# Patient Record
Sex: FEMALE | Race: WHITE
[De-identification: names, ages, dates, MRNs, and addresses within clinical notes are randomized per-mention and may not be internally consistent; named-entity substitution may affect disease eponyms.]

---

## 2017-04-04 ENCOUNTER — HOSPITAL ENCOUNTER (OUTPATIENT)
Dept: HOSPITAL 18 - NAVSJIPCSP | Age: 77
Discharge: HOME | End: 2017-04-04
Attending: INTERNAL MEDICINE
Payer: MEDICARE

## 2017-04-04 DIAGNOSIS — E78.5: Primary | ICD-10-CM

## 2017-04-04 DIAGNOSIS — I20.9: ICD-10-CM

## 2017-04-04 DIAGNOSIS — I10: ICD-10-CM

## 2017-04-04 LAB
ALBUMIN SERPL BCG-MCNC: 4 G/DL (ref 3.4–4.8)
ALP SERPL-CCNC: 79 U/L (ref 40–150)
ALT SERPL W P-5'-P-CCNC: 45 U/L (ref 0–55)
ANION GAP SERPL CALC-SCNC: 17 MMOL/L (ref 10–20)
AST SERPL-CCNC: 52 U/L (ref 5–34)
BASOPHILS # BLD AUTO: 0 THOU/UL (ref 0–0.2)
BASOPHILS NFR BLD AUTO: 0.7 % (ref 0–1)
BILIRUB SERPL-MCNC: 0.3 MG/DL (ref 0.2–1.2)
BUN SERPL-MCNC: 10 MG/DL (ref 9.8–20.1)
CALCIUM SERPL-MCNC: 9.1 MG/DL (ref 7.8–10.44)
CHD RISK SERPL-RTO: 4.5 (ref ?–4.5)
CHLORIDE SERPL-SCNC: 104 MMOL/L (ref 98–107)
CHOLEST SERPL-MCNC: 234 MG/DL
CO2 SERPL-SCNC: 25 MMOL/L (ref 23–31)
CREAT CL PREDICTED SERPL C-G-VRATE: 0 ML/MIN (ref 70–130)
EOSINOPHIL # BLD AUTO: 0.3 THOU/UL (ref 0–0.7)
EOSINOPHIL NFR BLD AUTO: 3.5 % (ref 0–10)
GLOBULIN SER CALC-MCNC: 2.8 G/DL (ref 2.4–3.5)
GLUCOSE SERPL-MCNC: 87 MG/DL (ref 83–110)
HDLC SERPL-MCNC: 52 MG/DL
HGB BLD-MCNC: 13.5 G/DL (ref 12–16)
LDLC SERPL CALC-MCNC: 110 MG/DL
LYMPHOCYTES # BLD AUTO: 2.3 THOU/UL (ref 1.2–3.4)
LYMPHOCYTES NFR BLD AUTO: 31.8 % (ref 21–51)
MCH RBC QN AUTO: 32.2 PG (ref 27–31)
MCV RBC AUTO: 93.8 FL (ref 81–99)
MONOCYTES # BLD AUTO: 0.7 THOU/UL (ref 0.11–0.59)
MONOCYTES NFR BLD AUTO: 10.2 % (ref 0–10)
NEUTROPHILS # BLD AUTO: 3.8 THOU/UL (ref 1.4–6.5)
NEUTROPHILS NFR BLD AUTO: 53.8 % (ref 42–75)
PLATELET # BLD AUTO: 212 THOU/UL (ref 130–400)
POTASSIUM SERPL-SCNC: 4.5 MMOL/L (ref 3.5–5.1)
RBC # BLD AUTO: 4.18 MILL/UL (ref 4.2–5.4)
SODIUM SERPL-SCNC: 141 MMOL/L (ref 136–145)
SP GR UR STRIP: 1.01 (ref 1–1.03)
TRIGL SERPL-MCNC: 362 MG/DL (ref ?–150)
WBC # BLD AUTO: 7.1 THOU/UL (ref 4.8–10.8)

## 2017-04-04 PROCEDURE — 80061 LIPID PANEL: CPT

## 2017-04-04 PROCEDURE — 85025 COMPLETE CBC W/AUTO DIFF WBC: CPT

## 2017-04-04 PROCEDURE — 81003 URINALYSIS AUTO W/O SCOPE: CPT

## 2017-04-04 PROCEDURE — 80053 COMPREHEN METABOLIC PANEL: CPT

## 2017-04-04 PROCEDURE — 84443 ASSAY THYROID STIM HORMONE: CPT

## 2017-04-04 PROCEDURE — 36415 COLL VENOUS BLD VENIPUNCTURE: CPT

## 2018-05-17 ENCOUNTER — HOSPITAL ENCOUNTER (OUTPATIENT)
Dept: HOSPITAL 92 - BICMAMMO | Age: 78
Discharge: HOME | End: 2018-05-17
Payer: MEDICARE

## 2018-05-17 DIAGNOSIS — Z12.31: Primary | ICD-10-CM

## 2018-05-17 DIAGNOSIS — R92.1: ICD-10-CM

## 2018-05-17 PROCEDURE — 77063 BREAST TOMOSYNTHESIS BI: CPT

## 2018-05-17 PROCEDURE — 77067 SCR MAMMO BI INCL CAD: CPT

## 2019-05-20 ENCOUNTER — HOSPITAL ENCOUNTER (OUTPATIENT)
Dept: HOSPITAL 92 - BICMAMMO | Age: 79
Discharge: HOME | End: 2019-05-20
Payer: MEDICARE

## 2019-05-20 DIAGNOSIS — Z12.31: Primary | ICD-10-CM

## 2019-05-20 PROCEDURE — 77067 SCR MAMMO BI INCL CAD: CPT

## 2019-05-20 PROCEDURE — 77063 BREAST TOMOSYNTHESIS BI: CPT

## 2019-05-20 NOTE — MMO
Bilateral MAMMO Bilat Screen [facility].

 

CLINICAL HISTORY:

Patient is 78 years old and is seen for screening. The patient has no family

history of breast cancer.  The patient has no personal history of cancer. The

patient has a history of left Stereotatic Biopsy in April, 2017 - benign.

 

VIEWS:

The views performed were:  bilateral craniocaudal with tomosynthesis and

bilateral mediolateral oblique with tomosynthesis.

 

FILMS COMPARED:

The present examination has been compared to a prior imaging study performed at

[facility] on 05/17/2018.

 

MAMMOGRAM FINDINGS:

There are scattered fibroglandular densities.

 

There are stable benign appearing calcifications seen in both breasts.

 

There are also vascular calcifications.

 

There are no suspicious masses, suspicious calcifications, or new areas of

architectural distortion.

 

IMPRESSION:

THERE IS NO MAMMOGRAPHIC EVIDENCE OF MALIGNANCY.

 

A ROUTINE FOLLOW-UP MAMMOGRAM IN 1 YEAR IS RECOMMENDED.

 

THE RESULTS OF THIS EXAM WERE SENT TO THE PATIENT.

 

ACR BI-RADS Category 2 - Benign finding

 

MAMMOGRAPHY NOTE:

 1. A negative mammogram report should not delay a biopsy if a dominant of

 clinically suspicious mass is present.

 2. Approximately 10% to 15% of breast cancers are not detected by

 mammography.

 3. Adenosis and dense breasts may obscure an underlying neoplasm.

## 2019-07-22 ENCOUNTER — HOSPITAL ENCOUNTER (OUTPATIENT)
Dept: HOSPITAL 18 - NAV CT | Age: 79
Discharge: HOME | End: 2019-07-22
Attending: INTERNAL MEDICINE
Payer: MEDICARE

## 2019-07-22 DIAGNOSIS — K57.30: ICD-10-CM

## 2019-07-22 DIAGNOSIS — K44.9: ICD-10-CM

## 2019-07-22 DIAGNOSIS — R94.5: Primary | ICD-10-CM

## 2019-07-22 DIAGNOSIS — R16.1: ICD-10-CM

## 2019-07-22 DIAGNOSIS — K76.0: ICD-10-CM

## 2019-07-22 LAB — CREAT CL PREDICTED SERPL C-G-VRATE: 0 ML/MIN (ref 70–130)

## 2019-07-22 PROCEDURE — 74177 CT ABD & PELVIS W/CONTRAST: CPT

## 2019-07-22 PROCEDURE — 82565 ASSAY OF CREATININE: CPT

## 2019-07-22 PROCEDURE — 36415 COLL VENOUS BLD VENIPUNCTURE: CPT

## 2019-07-22 NOTE — CT
CT ABDOMEN AND PELVIS WITH IV CONTRAST:

 

HISTORY: 

Elevated LFTs.

 

FINDINGS: 

There are minimal dependent changes in the lung bases.  The liver demonstrates decreased attenuation 
compared to the spleen consistent with fatty infiltration.  No calcified gallstones are seen.  There 
is borderline splenomegaly measuring 13.4 cm in the AP dimension.

 

The pancreas, adrenal glands, and kidneys are normal.  

 

No free air, free fluid, or lymphadenopathy is seen in the abdomen or pelvis.  The small bowel loops 
are not abnormally dilated.  A normal-appearing appendix is seen.  There is colonic diverticulosis wi
thout diverticulitis.  A small hiatal hernia is noted.

 

The patient is post hysterectomy.  There are vascular calcifications without evidence of aneurysmal d
ilatation of the abdominal aorta.  There are degenerative changes in the spine.

 

IMPRESSION: 

1.  Fatty liver.

 

2.  Borderline splenomegaly.

 

3.  Small hiatal hernia.

 

4.  Colonic diverticulosis.

 

POS: [facility]

## 2019-12-13 ENCOUNTER — HOSPITAL ENCOUNTER (OUTPATIENT)
Dept: HOSPITAL 92 - CCL | Age: 79
Discharge: HOME | End: 2019-12-13
Attending: INTERNAL MEDICINE
Payer: MEDICARE

## 2019-12-13 VITALS — BODY MASS INDEX: 30.7 KG/M2

## 2019-12-13 DIAGNOSIS — Z79.82: ICD-10-CM

## 2019-12-13 DIAGNOSIS — I25.10: Primary | ICD-10-CM

## 2019-12-13 DIAGNOSIS — E78.2: ICD-10-CM

## 2019-12-13 DIAGNOSIS — Z88.0: ICD-10-CM

## 2019-12-13 DIAGNOSIS — Z88.8: ICD-10-CM

## 2019-12-13 DIAGNOSIS — Z91.048: ICD-10-CM

## 2019-12-13 DIAGNOSIS — K21.9: ICD-10-CM

## 2019-12-13 DIAGNOSIS — I10: ICD-10-CM

## 2019-12-13 DIAGNOSIS — Z79.899: ICD-10-CM

## 2019-12-13 DIAGNOSIS — M19.90: ICD-10-CM

## 2019-12-13 PROCEDURE — 4A023N7 MEASUREMENT OF CARDIAC SAMPLING AND PRESSURE, LEFT HEART, PERCUTANEOUS APPROACH: ICD-10-PCS | Performed by: INTERNAL MEDICINE

## 2019-12-13 PROCEDURE — B2111ZZ FLUOROSCOPY OF MULTIPLE CORONARY ARTERIES USING LOW OSMOLAR CONTRAST: ICD-10-PCS | Performed by: INTERNAL MEDICINE

## 2019-12-13 PROCEDURE — C1769 GUIDE WIRE: HCPCS

## 2019-12-13 PROCEDURE — 93458 L HRT ARTERY/VENTRICLE ANGIO: CPT

## 2019-12-13 PROCEDURE — 99152 MOD SED SAME PHYS/QHP 5/>YRS: CPT

## 2020-09-10 ENCOUNTER — HOSPITAL ENCOUNTER (OUTPATIENT)
Dept: HOSPITAL 18 - NAV RAD | Age: 80
Discharge: HOME | End: 2020-09-10
Attending: INTERNAL MEDICINE
Payer: MEDICARE

## 2020-09-10 DIAGNOSIS — M19.012: ICD-10-CM

## 2020-09-10 DIAGNOSIS — M79.602: Primary | ICD-10-CM

## 2020-09-10 NOTE — RAD
Exam:Left shoulder 3 view



HISTORY: Pain



COMPARISON: None



FINDINGS: Severe degenerative change of the glenohumeral joint space. There is sclerosis and osteophy
te formation. No fracture or dislocation. There is a prominent subacromial spur.

Visualized left lung parenchyma and ribs do not demonstrate acute abnormality.



IMPRESSION: Degenerative changes of the left shoulder as described above. No dislocation or fracture.




Reported By: Mallory Godfrey 

Electronically Signed:  9/10/2020 10:02 AM

## 2020-09-10 NOTE — RAD
Exam:Left humerus 2 views



HISTORY: Pain



COMPARISON: None



FINDINGS: Severe degenerative changes of glenohumeral joint space. There is sclerosis and osteophyte 
formation. No fracture or dislocation.

Visualized left ribs and lung parenchyma do not demonstrate any acute abnormality.



IMPRESSION: Severe degenerative change in left humeral joint space.



Reported By: Mallory Godfrey 

Electronically Signed:  9/10/2020 10:02 AM